# Patient Record
(demographics unavailable — no encounter records)

---

## 2024-10-31 NOTE — REVIEW OF SYSTEMS
[As Noted in HPI] : as noted in HPI [Negative] : Neurological [Suicidal] : not suicidal [Loss Of Hearing] : no hearing loss [Palpitations] : no palpitations [Shortness Of Breath] : no shortness of breath [Abdominal Pain] : no abdominal pain [Vomiting] : no vomiting [Skin Lesions] : no skin lesions [Easy Bleeding] : no tendency for easy bleeding [Easy Bruising] : no tendency for easy bruising

## 2024-10-31 NOTE — ASSESSMENT
[FreeTextEntry1] : 39 yo male with SCD, contrast allergy, s/p 10/11/24 L ICA stent, and + 2mm cavernous R ICA aneurysm.  R groin well healed no issues.  PLAN: continue  continue Brillinta 90 mg 1 tab daily MRA head non con NOVA - 3 months RTO 3 months

## 2024-10-31 NOTE — PHYSICAL EXAM
[General Appearance - Alert] : alert [General Appearance - In No Acute Distress] : in no acute distress [Clean] : clean [Dry] : dry [Well-Healed] : well-healed [No Drainage] : without drainage [Normal Skin] : normal [Oriented To Time, Place, And Person] : oriented to person, place, and time [Impaired Insight] : insight and judgment were intact [] : no respiratory distress [Edema] : there was no peripheral edema [Abnormal Walk] : normal gait [Involuntary Movements] : no involuntary movements were seen [Motor Tone] : muscle strength and tone were normal [Skin Color & Pigmentation] : normal skin color and pigmentation [Erythema] : not erythematous [Tender] : not tender [Warm] : not warm [FreeTextEntry1] : NII polk [Limited Balance] : balance was intact [Tremor] : no tremor present

## 2024-10-31 NOTE — END OF VISIT
[FreeTextEntry3] : Patient seen and evaluated by OBDULIO Don. I have reviewed images and plan as discussed above.

## 2024-10-31 NOTE — HISTORY OF PRESENT ILLNESS
[FreeTextEntry1] : Buddy is a 40 yr old male with past medical history of sickle cell, Left hearing loss since 2019 (hearing aid). To note also has contrast allergy (even with premedication, he ends up in crisis/ hospitalized). 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   On 7/30/24, he underwent a cerebral angiogram by Dr. Aragon. Taking ASA 81 daily.   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    10/29/24: pt arrives for initial post procedure visit s/p 10/11/24 LICA stent.  Right groin well healed, no irritation, erythema, edema, pain, warmth of drainage. Transient Left eye black spots evaluated during hospitalization with no new infarcts or hemorrhages noted.  No further incidents.  Overall, feeling well, taking , and Brillinta 90 1 tab BID as directed.  9/24/24:  He arrives to establish care and discuss treatment plan for known aneurysms. Patient denies sudden severe headache, seizure, nausea, vomiting, fever, chest pain, palpitations, sob, vision, hearing, or speech disturbances, denies weakness, or focal weakness, denies balance issues.  PCP:  Kim Herrera manages SCA and heme issues/ exchanges etc..

## 2024-10-31 NOTE — ASSESSMENT
[FreeTextEntry1] : 41 yo male with SCD, contrast allergy, s/p 10/11/24 L ICA stent, and + 2mm cavernous R ICA aneurysm.  R groin well healed no issues.  PLAN: continue  continue Brillinta 90 mg 1 tab daily MRA head non con NOVA - 3 months RTO 3 months

## 2024-11-11 NOTE — DISCUSSION/SUMMARY
[FreeTextEntry1] : \par  The patient is due for pain medication.\par  ISTOP checked\par  Methadone 10 mg tab- #240\par  Oxycodone- 30 mg tab- #180\par  f/u 10/28/21\par  Kim Herrera MD

## 2024-11-11 NOTE — HISTORY OF PRESENT ILLNESS
[FreeTextEntry1] : 41 yo male with HGB SS, for pre-op eval [de-identified] : 39 yo male with HGB SS, for f/u. Is averaging about 1-2 hospitalizations per month, despite exchange transfusion. 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 Has neurosurgery f/u. Has been taking Brillinta 90 mg bid and ASA, no other NSAIDS. Want to the ED with loss of vision in right eye, yesterday. Did not have a CNS event, however, had a retinal hemorrhage in his right eye.  Has OCLI scheduled. Today, he is feeling well and is not having SCD pain.  Not a candidate for HU as it causes severe thrombocytopenia as well as SCD crisis pain in this patient.  No CP, no dyspnea, no LE edema.   PE: gen- thin male, in no distress vss anicteric oropharynx- poor dentition lungs- cta cor: rrr+2/6 leona abd- benign ext:-c/c/e, no ulcers  labs:11/11/24 HGB- 11.2 PLTs- 371 CMP WNL UA WNL  A/P- 39 yo male with hGB SS, CNS aneurisms, s/p cns stenting  1. SCD-exchange transfusion monthly 2. retinal bleed- close f/u with retina specialist 3. chronic pain Methadone- 20 mg bid  Oxycodone- 30 mg tab- 1-2 PO q 4-6 hours- #240/month 4. f/u with neurosurgery  5.  f/u for routine visit 8 weeks  Kim Herrera MD

## 2025-01-03 NOTE — HISTORY OF PRESENT ILLNESS
[de-identified] : 41 yo male with Hb SS who has about 1-2 hospitalizations per month despite monthly RCE.  Had H/A in back of L eye and L side of head and was found to have 1-2 aneurysms which were stented on 10/11/24.  He is now on 90 mg Brillinta bid and 325 of ASA' he should not be on ibuprofen or fish oil while on Brillinta.   He also had DL Vortex port changed on Dec 23 and was just released from hospital on Dec 31 for pain related to port placement.   Will not be able to have next RCE until FEbruary savanna has to wait 6 weeks from port placement.  Currently has some low back pain and  L hip pain from AVN.   His L sided H/A have resolved since his stenting.  He says the Brillinta is triggering pain crises all over the body.  He has not brought this up with the neurologist yet. Cannot tolerate HU due to low platelet count.  Wpuld like to try Endari again.   Vit D level 26 on Nov 24.  Ferritin level Nov 24 was 44.

## 2025-01-03 NOTE — REASON FOR VISIT
[Home] : at home, [unfilled] , at the time of the visit. [Other Location: e.g. 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

## 2025-01-03 NOTE — ASSESSMENT
[FreeTextEntry1] : 1.  Hb SS.  Continue RCE to keep Hct stable and HbS% around 30% for refractory pain.  Added back Endari per patient request.  2.  Chronic pain from AVN and back.  Renewed oxycodone and methadone.  3.  Vit D.  Has enough supply.  4,  Aneurysyms.  Doing well with resolution of H/As.  HIs tinnitus and hearing loss did not improve.  5.  AVN L hip.  Stable.  I spent 30 minutes wtih Mr. Pizarro.

## 2025-01-27 NOTE — HISTORY OF PRESENT ILLNESS
[FreeTextEntry1] : 41 yo male with HGB SS, for pre-op eval [de-identified] : 39 yo male with HGB SS, for f/u. Is averaging about 1-2 hospitalizations per month, despite exchange transfusion. 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 Has neurosurgery f/u. Has been taking Brillinta 90 mg bid and ASA, no other NSAIDS. Patient says that the Brillanta is causing frequent pain crisis, and more frequent hospitalizations. Having bleeding in retina. Saw ophtho, referred to retina specialist. S/P retinal bleed in right eye 2 months ago, now has blurring in left eye. Had a new port placed due top scar tissue one month ago. His exchange transfusion is being delayed by two weeks due to the new port placement.  Want to the ED with loss of vision in right eye, yesterday. Did not have a CNS event, however, had a retinal hemorrhage in his right eye.  Has OCLI scheduled. Today, he is feeling well and is not having SCD pain.  Not a candidate for HU as it causes severe thrombocytopenia as well as SCD crisis pain in this patient.  No CP, no dyspnea, no LE edema.   PE: gen- thin male, in no distress vss anicteric oropharynx- poor dentition lungs- cta cor: rrr+2/6 leona abd- benign ext:-c/c/e, no ulcers  labs:11/11/24 HGB- 11.2 PLTs- 371 CMP WNL UA WNL  A/P- 39 yo male with hGB SS, CNS aneurisms, s/p cns stenting  1. SCD-exchange transfusion monthly, next delayed for two weeks 2. retinal bleed- close f/u with retina specialist 3. chronic pain Methadone- 20 mg bid  Oxycodone- 30 mg tab- 1-2 PO q 4-6 hours- #240/month 4. f/u with neurosurgery -ask @ changing to a different blood thinner 5.  f/u for routine visit 8 weeks 6. refer back to retina specialist  Kim Herrera MD

## 2025-01-28 NOTE — REASON FOR VISIT
[Home] : at home, [unfilled] , at the time of the visit. [Medical Office: ([facility])___] : at the medical office located in  [Verbal consent obtained from patient] : the patient, [unfilled]

## 2025-01-28 NOTE — REASON FOR VISIT
[Home] : at home, [unfilled] , at the time of the visit. [Medical Office: ([facility])___] : at the medical office located in  [Verbal consent obtained from patient] : the patient, [unfilled]

## 2025-02-02 NOTE — END OF VISIT
[FreeTextEntry3] :  I have evaluated patient with NP Guillermina oDn who has completed the documentation above.

## 2025-02-02 NOTE — END OF VISIT
[FreeTextEntry3] :  I have evaluated patient with NP Guillermina Don who has completed the documentation above.

## 2025-02-02 NOTE — RESULTS/DATA
[FreeTextEntry1] : ACC: [id number]     EXAM:  MR ANGIO BRAIN   ORDERED BY: LORENZO RODRIGUEZ  PROCEDURE DATE:  01/22/2025  INTERPRETATION:  Clinical indication: Stented left supraclinoid internal carotid artery for aneurysm, follow-up  A 3-D axial noncontrast MRA were performed on the cervical and intracranial vessels, respectively. Intravascular flow quantification was performed using gated 2D phase contrast MR, imaged perpendicular to the vessel axis.  Images were post processed NOVA software and a NOVA flow study report is available.   Comparison is made with the conventional angiogram of 12/19/2024.   There is signal dropout overlying the left cavernous/supraclinoid internal carotid artery related to the presence of a stent. Good intracranial flow is identified using noninvasive flow MR angiography.  Flow is as follows in milliliters per minute.  MIGUEL 256, RMCA 214, RACA 104, RACA2 92  LICA 271, LMCA 163, LACA 90, LACA2 125  , LVA 59, , RPCA 92, LPCA 72  IMPRESSION:  Signal dropout overlying the left cavernous/clinoid internal carotid artery related to the stent. Good intracranial flow using noninvasive flow MR angiography as above.  --- End of Report ---  TRIXIE AGUILERA MD; Attending Radiologist This document has been electronically signed. Jan 22 2025 12:18PM

## 2025-02-02 NOTE — RESULTS/DATA
[FreeTextEntry1] : ACC: [id number]     EXAM:  MR ANGIO BRAIN   ORDERED BY: LORENZO RODRIGUEZ  PROCEDURE DATE:  01/22/2025  INTERPRETATION:  Clinical indication: Stented left supraclinoid internal carotid artery for aneurysm, follow-up  A 3-D axial noncontrast MRA were performed on the cervical and intracranial vessels, respectively. Intravascular flow quantification was performed using gated 2D phase contrast MR, imaged perpendicular to the vessel axis.  Images were post processed NOVA software and a NOVA flow study report is available.   Comparison is made with the conventional angiogram of 12/19/2024.   There is signal dropout overlying the left cavernous/supraclinoid internal carotid artery related to the presence of a stent. Good intracranial flow is identified using noninvasive flow MR angiography.  Flow is as follows in milliliters per minute.  MIGUEL 256, RMCA 214, RACA 104, RACA2 92  LICA 271, LMCA 163, LACA 90, LACA2 125  , LVA 59, , RPCA 92, LPCA 72  IMPRESSION:  Signal dropout overlying the left cavernous/clinoid internal carotid artery related to the stent. Good intracranial flow using noninvasive flow MR angiography as above.  --- End of Report ---  TRIXIE AGUILERA MD; Attending Radiologist This document has been electronically signed. Jan 22 2025 12:18PM

## 2025-02-02 NOTE — HISTORY OF PRESENT ILLNESS
[FreeTextEntry1] : Buddy is a 40 yr old male with past medical history of sickle cell, Left hearing loss since 2019 (hearing aid). To note also has contrast allergy (even with premedication, he ends up in crisis/ hospitalized). 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   On 7/30/24, he underwent a cerebral angiogram by Dr. Aragon. Taking ASA 81 daily.  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    1/28/25: pt arrives for 3 month f/u with MRA/NOVA of 1/22/25 for review in PACS/NW.  Pt reports on Brilinta has frequent pain and Sickle cell crisis and would like to know if can change medication.  Patient currently in hospital with pain crisis today.  Reviewed NOVA shows good blood flow.  due for 6 month follow up cerebral angiogram to assess LICA stent placed 10/11/24.   10/29/24: pt arrives for initial post procedure visit s/p 10/11/24 LICA stent.  Right groin well healed, no irritation, erythema, edema, pain, warmth of drainage. Transient Left eye black spots evaluated during hospitalization with no new infarcts or hemorrhages noted.  No further incidents.  Overall, feeling well, taking , and Brillinta 90 1 tab BID as directed.  9/24/24:  He arrives to establish care and discuss treatment plan for known aneurysms. Patient denies sudden severe headache, seizure, nausea, vomiting, fever, chest pain, palpitations, sob, vision, hearing, or speech disturbances, denies weakness, or focal weakness, denies balance issues.  PCP:  Kim Herrera manages SCA and heme issues/ exchanges etc..

## 2025-02-02 NOTE — RESULTS/DATA
[FreeTextEntry1] : ACC: [id number]     EXAM:  MR ANGIO BRAIN   ORDERED BY: LORENZO RODRIGUEZ  PROCEDURE DATE:  01/22/2025  INTERPRETATION:  Clinical indication: Stented left supraclinoid internal carotid artery for aneurysm, follow-up  A 3-D axial noncontrast MRA were performed on the cervical and intracranial vessels, respectively. Intravascular flow quantification was performed using gated 2D phase contrast MR, imaged perpendicular to the vessel axis.  Images were post processed NOVA software and a NOVA flow study report is available.   Comparison is made with the conventional angiogram of 12/19/2024.   There is signal dropout overlying the left cavernous/supraclinoid internal carotid artery related to the presence of a stent. Good intracranial flow is identified using noninvasive flow MR angiography.  Flow is as follows in milliliters per minute.  MIGUEL 256, RMCA 214, RACA 104, RACA2 92  LICA 271, LMCA 163, LACA 90, LACA2 125  , LVA 59, , RPCA 92, LPCA 72  IMPRESSION:  Signal dropout overlying the left cavernous/clinoid internal carotid artery related to the stent. Good intracranial flow using noninvasive flow MR angiography as above.  --- End of Report ---  TRIXIE AGUILERA MD; Attending Radiologist This document has been electronically signed. Jan 22 2025 12:18PM

## 2025-02-02 NOTE — RESULTS/DATA
[FreeTextEntry1] : ACC: [id number]     EXAM:  MR ANGIO BRAIN   ORDERED BY: LORENZO RODRIGUEZ  PROCEDURE DATE:  01/22/2025  INTERPRETATION:  Clinical indication: Stented left supraclinoid internal carotid artery for aneurysm, follow-up  A 3-D axial noncontrast MRA were performed on the cervical and intracranial vessels, respectively. Intravascular flow quantification was performed using gated 2D phase contrast MR, imaged perpendicular to the vessel axis.  Images were post processed NOVA software and a NOVA flow study report is available.   Comparison is made with the conventional angiogram of 12/19/2024.   There is signal dropout overlying the left cavernous/supraclinoid internal carotid artery related to the presence of a stent. Good intracranial flow is identified using noninvasive flow MR angiography.  Flow is as follows in milliliters per minute.  MIGUEL 256, RMCA 214, RACA 104, RACA2 92  LICA 271, LMCA 163, LACA 90, LACA2 125  , LVA 59, , RPCA 92, LPCA 72  IMPRESSION:  Signal dropout overlying the left cavernous/clinoid internal carotid artery related to the stent. Good intracranial flow using noninvasive flow MR angiography as above.  --- End of Report ---  TRIXIE AGUILERA MD; Attending Radiologist This document has been electronically signed. Jan 22 2025 12:18PM

## 2025-02-02 NOTE — ASSESSMENT
[FreeTextEntry1] : LICA stent placed 10/11/24, Sickel cell anemia (severe)  1/22/25 MRA head non con w/NOVA MIGUEL 256, RMCA 214, RACA 104, RACA2 92 LICA 271, LMCA 163, LACA 90, LACA2 125 , LVA 59, , RPCA 92, LPCA 72 IMPRESSION: Signal dropout overlying the left cavernous/clinoid internal carotid artery related to the stent. Good intracranial flow using noninvasive flow MR angiography as above.  3 month f/u with MRA/NOVA of 1/22/25 for review in PACS/NW.  Pt reports on Brilinta has frequent pain and Sickle cell crisis and would like to know if can change medication.  Patient currently in hospital with pain crisis today.  Reviewed NOVA shows good blood flow.  PLAN: due for 6 month follow up cerebral angiogram to assess LICA stent placed 10/11/24. - schedule for 4/2025 PST PCP Dr. Herrera consult prior for transfusion plan prior to cerebral angiogram as before  continue  and Brilinta 90 bid up to and including day of procedure

## 2025-03-14 NOTE — HISTORY OF PRESENT ILLNESS
[FreeTextEntry1] : Buddy is a 40 yr old male with past medical history of sickle cell, Left hearing loss since 2019 (hearing aid). To note also has contrast allergy (even with premedication, he ends up in crisis/ hospitalized). 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   On 7/30/24, he underwent a cerebral angiogram by Dr. Aragon. Taking ASA 81 daily.  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    3/18/25: pt arrives for 3 month f/u  due for 6 month follow up cerebral angiogram to assess LICA stent placed 10/11/24.  Brilinta 90 bid   1/28/25: pt arrives for 3 month f/u with MRA/NOVA of 1/22/25 for review in PACS/NW.  Pt reports on Brilinta has frequent pain and Sickle cell crisis and would like to know if can change medication.  Patient currently in hospital with pain crisis today.  Reviewed NOVA shows good blood flow.  10/29/24: pt arrives for initial post procedure visit s/p 10/11/24 LICA stent.  Right groin well healed, no irritation, erythema, edema, pain, warmth of drainage. Transient Left eye black spots evaluated during hospitalization with no new infarcts or hemorrhages noted.  No further incidents.  Overall, feeling well, taking , and Brillinta 90 1 tab BID as directed.  9/24/24:  He arrives to establish care and discuss treatment plan for known aneurysms. Patient denies sudden severe headache, seizure, nausea, vomiting, fever, chest pain, palpitations, sob, vision, hearing, or speech disturbances, denies weakness, or focal weakness, denies balance issues.  PCP:  Kim Herrera manages SCA and heme issues/ exchanges etc..

## 2025-03-20 NOTE — HISTORY OF PRESENT ILLNESS
[de-identified] : 40 year old man presents for evaluation for possible Left TM perforation that seen in [facility] about one month ago.  Last seen in office 3/10/2020  H/o bilateral SNHL and Tinnitus L>R Reports some Left bloody otorrhea and Right yellow otorrhea for about one year. Was prescribed Bactrim and Cipro drops with some relief. Wears hearing aids in both ears - mother feels hearing has gotten worse.  Constant left tinnitus - stable Patient denies otalgia, recent fevers or ear infections, dizziness, vertigo, headaches related to hearing.

## 2025-03-20 NOTE — PHYSICAL EXAM
[Binocular Microscopic Exam] : Binocular microscopic exam was performed [Hearing Warner Test (Tuning Fork On Forehead)] : no lateralization of tone [Midline] : trachea located in midline position [Normal] : orientation to person, place, and time: normal [Hearing Loss Right Only] : normal [Hearing Loss Left Only] : normal [Rinne Test Air Conduction Persists > Bone Conduction Right] : bone conduction greater than air conduction on the right [Rinne Test Air Conduction Persists > Bone Conduction Left] : bone conduction greater than air conduction on the left [Nystagmus] : ~T no ~M nystagmus was seen [Fukuda Step Test] : Fukuda Step Test was Negative [Romberg's Sign] : Romberg's sign was absent [Fistula Sign] : Fistula Sign: Negative [Past-Pointing] : Past-Pointing: Negative [Gloria-Hallwaike] : [place]-Hallpike: Negative

## 2025-03-20 NOTE — HISTORY OF PRESENT ILLNESS
[de-identified] : 40 year old man presents for evaluation for possible Left TM perforation that seen in [facility] about one month ago.  Last seen in office 3/10/2020  H/o bilateral SNHL and Tinnitus L>R Reports some Left bloody otorrhea and Right yellow otorrhea for about one year. Was prescribed Bactrim and Cipro drops with some relief. Wears hearing aids in both ears - mother feels hearing has gotten worse.  Constant left tinnitus - stable Patient denies otalgia, recent fevers or ear infections, dizziness, vertigo, headaches related to hearing.

## 2025-03-20 NOTE — PHYSICAL EXAM
[Binocular Microscopic Exam] : Binocular microscopic exam was performed [Hearing Warner Test (Tuning Fork On Forehead)] : no lateralization of tone [Midline] : trachea located in midline position [Normal] : orientation to person, place, and time: normal [Hearing Loss Right Only] : normal [Hearing Loss Left Only] : normal [Rinne Test Air Conduction Persists > Bone Conduction Right] : bone conduction greater than air conduction on the right [Nystagmus] : ~T no ~M nystagmus was seen [Rinne Test Air Conduction Persists > Bone Conduction Left] : bone conduction greater than air conduction on the left [Fukuda Step Test] : Fukuda Step Test was Negative [Romberg's Sign] : Romberg's sign was absent [Fistula Sign] : Fistula Sign: Negative [Past-Pointing] : Past-Pointing: Negative [Gloria-Hallwaike] : [place]-Hallpike: Negative

## 2025-03-20 NOTE — DATA REVIEWED
[de-identified] : An audiogram was ordered and performed including pure tones, tympanometry, and speech testing for the patients complaint of hearing loss I have independently reviewed the patient's audiogram for today and my findings include adalberto SNHL, L ear to 20% SDS

## 2025-03-20 NOTE — DATA REVIEWED
[de-identified] : An audiogram was ordered and performed including pure tones, tympanometry, and speech testing for the patients complaint of hearing loss I have independently reviewed the patient's audiogram for today and my findings include adalberto SNHL, L ear to 20% SDS No

## 2025-03-20 NOTE — PROCEDURE
[Same] : same as the Pre Op Dx. [] : Binocular Microscopy [FreeTextEntry4] : none [FreeTextEntry6] : Operative microscope was used to examine the ear canal, ear drum and visible middle ear landmarks. Adequate exam would not have been possible without the use of a microscope. Findings are described.

## 2025-03-30 NOTE — HISTORY OF PRESENT ILLNESS
[FreeTextEntry1] : 41 yo male with HGB SS, for pre-op eval [de-identified] : 41 yo male with HGB SS, for f/u. Is averaging about 1-2 hospitalizations per month, despite exchange transfusion. 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 Going for f/u arteriogram 4/18/25 ( will require steroids pre-study for dye allergy). Has been taking Brillinta 90 mg bid and ASA, no other NSAIDS. Patient says that the Brillanta is causing frequent pain crisis, and more frequent hospitalizations. Having bleeding in retina. Saw ophtho, referred to retina specialist. S/P retinal bleed in right eye 2 months ago, now has blurring in left eye. Also, getting a cochlear implant at the end of April.  Had a new port placed due top scar tissue 3 months ago. His exchange transfusions are going smoothly now, UTD. Last performed 3/21/25. Want to the ED with loss of vision in right eye, yesterday. Did not have a CNS event, however, had a retinal hemorrhage in his right eye.  Has OCLI scheduled. Today, he is feeling well and is not having SCD pain.  Not a candidate for HU as it causes severe thrombocytopenia as well as SCD crisis pain in this patient.  No CP, no dyspnea, no LE edema.   PE: gen- thin male, in no distress vss anicteric oropharynx- poor dentition lungs- cta cor: rrr+2/6 leona abd- benign ext:-c/c/e, no ulcers  labs:3/26/25 HGB- 10.5 PLTs- 371 CMP WNL UA WNL Pre-HGB 39.7-->79.4  A/P- 41 yo male with hGB SS, CNS aneurisms, s/p cns stenting. Now going for angiogram f/u of stent placement, as well as cochlear implant. 1.For stent placement- Exchange transfusion as close pre-procedure as possible. Prednisone 50 mg PO 13 hours, 7 hours, and 1 hour prior to angiogram Benedryl 50 mg PO, one hour prior to angiogram Stop Brillanta 2 days piror to the angiogram  1. SCD-exchange transfusion monthly, next delayed for two weeks 2. retinal bleed- close f/u with retina specialist 3. chronic pain Methadone- 20 mg bid  Oxycodone- 30 mg tab- 1-2 PO q 4-6 hours- #240/month 4. f/u with neurosurgery -ask @ changing to a different blood thinner 5.  f/u for routine visit 8 weeks 6. prevnar at next outpatient visit  Kim Herrera MD

## 2025-03-30 NOTE — HISTORY OF PRESENT ILLNESS
[FreeTextEntry1] : 39 yo male with HGB SS, for pre-op eval [de-identified] : 41 yo male with HGB SS, for f/u. Is averaging about 1-2 hospitalizations per month, despite exchange transfusion. 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 Going for f/u arteriogram 4/18/25 ( will require steroids pre-study for dye allergy). Has been taking Brillinta 90 mg bid and ASA, no other NSAIDS. Patient says that the Brillanta is causing frequent pain crisis, and more frequent hospitalizations. Having bleeding in retina. Saw ophtho, referred to retina specialist. S/P retinal bleed in right eye 2 months ago, now has blurring in left eye. Also, getting a cochlear implant at the end of April.  Had a new port placed due top scar tissue 3 months ago. His exchange transfusions are going smoothly now, UTD. Last performed 3/21/25. Want to the ED with loss of vision in right eye, yesterday. Did not have a CNS event, however, had a retinal hemorrhage in his right eye.  Has OCLI scheduled. Today, he is feeling well and is not having SCD pain.  Not a candidate for HU as it causes severe thrombocytopenia as well as SCD crisis pain in this patient.  No CP, no dyspnea, no LE edema.   PE: gen- thin male, in no distress vss anicteric oropharynx- poor dentition lungs- cta cor: rrr+2/6 leona abd- benign ext:-c/c/e, no ulcers  labs:3/26/25 HGB- 10.5 PLTs- 371 CMP WNL UA WNL Pre-HGB 39.7-->79.4  A/P- 41 yo male with hGB SS, CNS aneurisms, s/p cns stenting. Now going for angiogram f/u of stent placement, as well as cochlear implant. 1.For stent placement- Exchange transfusion as close pre-procedure as possible. Prednisone 50 mg PO 13 hours, 7 hours, and 1 hour prior to angiogram Benedryl 50 mg PO, one hour prior to angiogram Stop Brillanta 2 days piror to the angiogram  1. SCD-exchange transfusion monthly, next delayed for two weeks 2. retinal bleed- close f/u with retina specialist 3. chronic pain Methadone- 20 mg bid  Oxycodone- 30 mg tab- 1-2 PO q 4-6 hours- #240/month 4. f/u with neurosurgery -ask @ changing to a different blood thinner 5.  f/u for routine visit 8 weeks 6. prevnar at next outpatient visit  iKm Herrera MD

## 2025-04-10 NOTE — REASON FOR VISIT
[Home] : at home, [unfilled] , at the time of the visit. [Medical Office: ([facility])___] : at the medical office located in  [Telephone (audio)] : This telephonic visit was provided via audio only technology. [Verbal consent obtained from patient] : the patient, [unfilled]

## 2025-04-10 NOTE — REASON FOR VISIT
[Home] : at home, [unfilled] , at the time of the visit. [Medical Office: ([facility])___] : at the medical office located in  [Telephone (audio)] : This telephonic visit was provided via audio only technology. [Verbal consent obtained from patient] : the patient, [unfilled]

## 2025-04-10 NOTE — REASON FOR VISIT
[Home] : at home, [unfilled] , at the time of the visit. [Medical Office: ([facility])___] : at the medical office located in  [Telephone (audio)] : This telephonic visit was provided via audio only technology. [Verbal consent obtained from patient] : the patient, [unfilled]

## 2025-04-14 NOTE — ASSESSMENT
[FreeTextEntry1] : Buddy is a 40 yr old male with past medical history of sickle cell, Left hearing loss since 2019 (hearing aid). To note also has contrast allergy (even with premedication, he ends up in crisis/ hospitalized). 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   On 7/30/24, he underwent a cerebral angiogram by Dr. Aragon. Taking ASA 81 daily.  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  Patient underwent Pipeline embolization of LICA aneurysm in 10/2024.  4/10/25: Today, he arrives to [facility] care due for 6 month follow up cerebral angiogram to assess LICA stent placed 10/11/24. Compliant on  and Brilinta 90 bid  PLAN Schedule diagnostic cerebral angiogram 4/18/25 PST scheduled  continue ASA / Brilinta 90 bid up to and including day of procedure PCP:  Kim Herrera manages SCA and heme issues/ exchanges etc..

## 2025-04-14 NOTE — HISTORY OF PRESENT ILLNESS
[FreeTextEntry1] : Buddy is a 40 yr old male with past medical history of sickle cell, Left hearing loss since 2019 (hearing aid). To note also has contrast allergy (even with premedication, he ends up in crisis/ hospitalized). 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   On 7/30/24, he underwent a cerebral angiogram by Dr. Aragon. Taking ASA 81 daily.  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    4/10/25: Today, he arrives to [facility] [place] for 6 month follow up cerebral angiogram to assess LICA stent placed 10/11/24. Compliant on  and Brilinta 90 bid   MRA/NOVA of 1/22/25 for review in PACS/NW reviewed NOVA shows good blood flow.  Patient denies sudden severe headache, seizure, nausea, vomiting, fever, chest pain, palpitations, sob, vision, hearing, or speech disturbances, denies weakness, or focal weakness, denies balance issues.  PCP:  Kim Herrera manages SCA and heme issues/ exchanges etc..

## 2025-04-14 NOTE — ASSESSMENT
[FreeTextEntry1] : Buddy is a 40 yr old male with past medical history of sickle cell, Left hearing loss since 2019 (hearing aid). To note also has contrast allergy (even with premedication, he ends up in crisis/ hospitalized). 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   On 7/30/24, he underwent a cerebral angiogram by Dr. Aragon. Taking ASA 81 daily.  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  Patient underwent Pipeline embolization of LICA aneurysm in 10/2024.  4/10/25: Today, he arrives to \[facility]\"" care due for 6 month follow up cerebral angiogram to assess LICA stent placed 10/11/24. Compliant on  and Brilinta 90 bid  PLAN Schedule diagnostic cerebral angiogram 4/18/25 PST scheduled  continue ASA / Brilinta 90 bid up to and including day of procedure PCP:  Kim Herrera manages SCA and heme issues/ exchanges etc..

## 2025-04-14 NOTE — HISTORY OF PRESENT ILLNESS
[FreeTextEntry1] : Buddy is a 40 yr old male with past medical history of sickle cell, Left hearing loss since 2019 (hearing aid). To note also has contrast allergy (even with premedication, he ends up in crisis/ hospitalized). 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   On 7/30/24, he underwent a cerebral angiogram by Dr. Aragon. Taking ASA 81 daily.  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    4/10/25: Today, he [place] [facility] [place] 6 month follow up cerebral angiogram to assess LICA stent placed 10/11/24. Compliant on  and Brilinta 90 bid   MRA/NOVA of 1/22/25 for review in PACS/NW reviewed NOVA shows good blood flow.  Patient denies sudden severe headache, seizure, nausea, vomiting, fever, chest pain, palpitations, sob, vision, hearing, or speech disturbances, denies weakness, or focal weakness, denies balance issues.  PCP:  Kim Herrera manages SCA and heme issues/ exchanges etc..

## 2025-04-14 NOTE — ASSESSMENT
[FreeTextEntry1] : Buddy is a 40 yr old male with past medical history of sickle cell, Left hearing loss since 2019 (hearing aid). To note also has contrast allergy (even with premedication, he ends up in crisis/ hospitalized). 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   On 7/30/24, he underwent a cerebral angiogram by Dr. Aragon. Taking ASA 81 daily.  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  Patient underwent Pipeline embolization of LICA aneurysm in 10/2024.  4/10/25: Today, he arrives to [facility] care due for 6 month follow up cerebral angiogram to assess LICA stent placed 10/11/24. Compliant on  and Brilinta 90 bid  PLAN Schedule diagnostic cerebral angiogram 4/18/25 PST scheduled  continue ASA / Brilinta 90 bid up to and including day of procedure PCP:  Kim Herrera manages SCA and heme issues/ exchanges etc..

## 2025-04-14 NOTE — HISTORY OF PRESENT ILLNESS
[FreeTextEntry1] : Buddy is a 40 yr old male with past medical history of sickle cell, Left hearing loss since 2019 (hearing aid). To note also has contrast allergy (even with premedication, he ends up in crisis/ hospitalized). 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   On 7/30/24, he underwent a cerebral angiogram by Dr. Aragon. Taking ASA 81 daily.  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    4/10/25: Today, he arrives to [facility] [place] for 6 month follow up cerebral angiogram to assess LICA stent placed 10/11/24. Compliant on  and Brilinta 90 bid   MRA/NOVA of 1/22/25 for review in PACS/NW reviewed NOVA shows good blood flow.  Patient denies sudden severe headache, seizure, nausea, vomiting, fever, chest pain, palpitations, sob, vision, hearing, or speech disturbances, denies weakness, or focal weakness, denies balance issues.  PCP:  Kim Herrera manages SCA and heme issues/ exchanges etc..

## 2025-04-24 NOTE — HISTORY OF PRESENT ILLNESS
[FreeTextEntry1] : Patient is a 40 year old male who has been seen by Dr. Price for hearing loss and CI consultation. Patient intends to move forward with CI. Patient has a longstanding history of bilateral SNHL and has been seen at this center for hearing aids in the past. Most recent hearing test results show moderately severe to severe SNHL in the left ear and moderately severe to profound SNHL in the right ear. Patient has been medically cleared for new hearing aids.

## 2025-05-01 NOTE — HISTORY OF PRESENT ILLNESS
[FreeTextEntry1] : 41 yo male with HGB SS, for pre-op eval [de-identified] : 39 yo male with HGB SS, for f/u. Is averaging about 1-2 hospitalizations per month, despite exchange transfusion. Indicaton for monthly exchange transfusion was chronic pain.   S/P stenting of 2 CNS aneurisms 10/24, with f/u cerebral angiogram 4/18/25. Results are still pending. Patient no longer requires Brillinta.  + h/o SCD retinopathy with bleed, following with OCLI Also, getting a cochlear implant . Notes that his hearing is worsening.  Had a new port placed due top scar tissue 6 months ago. His exchange transfusions are going smoothly now, UTD. Last performed 3/21/25. Today, he is feeling well and is not having SCD pain.  Not a candidate for HU as it causes severe thrombocytopenia as well as SCD crisis pain in this patient.  Fx significant for death of a brother due to SCD, GVHD post BMT No CP, no dyspnea, no LE edema.   PE: gen- thin male, in no distress vss anicteric oropharynx- poor dentition lungs- cta cor: rrr+2/6 leona abd- benign ext:-c/c/e, no ulcers  labs:4/30/25 HGB- 10.4, PLTs 429 CMP essentially WNL UA WNL Pre-HGB A 72.5 Ferritin 3/20/25- 31  A/P- 39 yo male with hGB SS, CNS aneurisms, s/p cns stenting and cerebral angio, results pending. Will be going for cochlear implant 1.For cochlear implant- Exchange transfusion as close pre-procedure as possible. 2. SCD-exchange transfusion monthly f/u with hematology 2. retinal bleed- close f/u with retina specialist 3. chronic pain Methadone- 20 mg bid  Oxycodone- 30 mg tab- 1-2 PO q 4-6 hours- #240/month 4.  f/u for routine visit 4 weeks 5. prevnar at next outpatient visit  Kim Herrera MD

## 2025-05-01 NOTE — HISTORY OF PRESENT ILLNESS
[FreeTextEntry1] : 39 yo male with HGB SS, for pre-op eval [de-identified] : 39 yo male with HGB SS, for f/u. Is averaging about 1-2 hospitalizations per month, despite exchange transfusion. Indicaton for monthly exchange transfusion was chronic pain.   S/P stenting of 2 CNS aneurisms 10/24, with f/u cerebral angiogram 4/18/25. Results are still pending. Patient no longer requires Brillinta.  + h/o SCD retinopathy with bleed, following with OCLI Also, getting a cochlear implant . Notes that his hearing is worsening.  Had a new port placed due top scar tissue 6 months ago. His exchange transfusions are going smoothly now, UTD. Last performed 3/21/25. Today, he is feeling well and is not having SCD pain.  Not a candidate for HU as it causes severe thrombocytopenia as well as SCD crisis pain in this patient.  Fx significant for death of a brother due to SCD, GVHD post BMT No CP, no dyspnea, no LE edema.   PE: gen- thin male, in no distress vss anicteric oropharynx- poor dentition lungs- cta cor: rrr+2/6 leona abd- benign ext:-c/c/e, no ulcers  labs:4/30/25 HGB- 10.4, PLTs 429 CMP essentially WNL UA WNL Pre-HGB A 72.5 Ferritin 3/20/25- 31  A/P- 39 yo male with hGB SS, CNS aneurisms, s/p cns stenting and cerebral angio, results pending. Will be going for cochlear implant 1.For cochlear implant- Exchange transfusion as close pre-procedure as possible. 2. SCD-exchange transfusion monthly f/u with hematology 2. retinal bleed- close f/u with retina specialist 3. chronic pain Methadone- 20 mg bid  Oxycodone- 30 mg tab- 1-2 PO q 4-6 hours- #240/month 4.  f/u for routine visit 4 weeks 5. prevnar at next outpatient visit  Kim Herrera MD

## 2025-06-07 NOTE — HISTORY OF PRESENT ILLNESS
[FreeTextEntry1] : 41 yo male with HGB SS, for f/u [de-identified] : 41 yo male with HGB SS, for f/u. Is averaging about 1-2 hospitalizations per month, despite exchange transfusion. Indicaton for monthly exchange transfusion was chronic pain.   S/P stenting of 2 CNS aneurisms 10/24, with f/u stenting of cerebral aneurisms ( successful) 10/24. Patient no longer requires Brillinta.  + h/o SCD retinopathy with bleed, following with OCLI Also,will be getting a cochlear implant in August. Had a new port placed due top scar tissue 8 months ago. His exchange transfusions are going smoothly now. Will be performed 6/5/25. Today, he is feeling well and is not having SCD pain.  Not a candidate for HU as it causes severe thrombocytopenia as well as SCD crisis pain in this patient.  Fx significant for death of a brother due to SCD, GVHD post BMT No CP, no dyspnea, no LE edema.   PE: gen- thin male, in no distress vss anicteric oropharynx- poor dentition lungs- cta cor: rrr+2/6 leona abd- benign ext:-c/c/e, no ulcers  labs:6/2/25 HGB- 11 post transfusion, PLTs 243 CMP WNL UA WNL Pre-HGB A 40.1, post 76.8 Ferritin 3/20/25- 31  A/P- 41 yo male with hGB SS, CNS aneurisms, s/p cns stenting and cerebral angio, results pending. Will be going for cochlear implant 1.eventual cochlear implant- Exchange transfusion as close pre-procedure as possible. 2. SCD-exchange transfusion monthly f/u with hematology 2. retinal bleed- close f/u with retina specialist 3. chronic pain Methadone- 20 mg bid  Oxycodone- 30 mg tab- 1-2 PO q 4-6 hours- #240/month 4.  f/u for routine visit 4 weeks 5. prevnar at next outpatient visit  Kim Herrera MD

## 2025-06-09 NOTE — HISTORY OF PRESENT ILLNESS
[FreeTextEntry1] : establish care [de-identified] : 39 yo male with HGB SS, for f/u. per chart review, pt is averaging about 1-2 hospitalizations per month, despite exchange transfusion.  S/P stenting of 2 CNS aneurisms 10/24, with f/u stenting of cerebral aneurisms ( successful) 10/24. Patient no longer requires Brillinta. + h/o SCD retinopathy with bleed. Pt feels well today without SCD pain. Reports having a transfusion yesterday and tolerated well. Pt is concerned about maintaining close communication with his medical team with his current pcp retiring.  Fx significant for death of a brother due to SCD, GVHD post BMT. No CP, no dyspnea, no LE edema.

## 2025-06-09 NOTE — ASSESSMENT
[FreeTextEntry1] : A/P- 39 yo male with hGB SS, CNS aneurisms, s/p cns stenting and cerebral angio, results pending.  #SCD - Not a candidate for HU as it causes severe thrombocytopenia as well as SCD crisis pain in this patient - exchange transfusion monthly, most recently 6/5/25, Had a new port placed due top scar tissue 4th quarter 2024 - f/u with hematology, scheduled 6/10/25  #Hearing deficits - Will be going for cochlear implant, potentially August 2025  - Exchange transfusion as close pre-procedure as possible.  #retinal bleed - close f/u with retina specialist  #chronic pain - Indicaton for monthly exchange transfusion was chronic pain. - Methadone- 20 mg bid - Oxycodone- 30 mg tab- 1-2 PO q 4-6 hours- #240/month  HCM  - RTC 4 week routine visit - prevnar at next outpatient visit

## 2025-06-12 NOTE — HISTORY OF PRESENT ILLNESS
[de-identified] : 40 year old M with  [  ] HbSS   [  ] HbSC   [  ] HbS-beta-0-thal  [  ] HbS-beta-+-thal  disease. Baseline Hgb: Whose course has been complicated by: sickle retinopathy with bleed   Today's Visit: Hospitalized since last office visit:   Yes [  ]   No [  ]         Date:         Transfused:  Yes [  ]   No [  ]         Antibiotics:  Yes [  ]   No [  ]   ED visit since last office visit:   Yes [  ]   No [  ]         Date:   History: Acute chest syndrome:  Yes [  ]   No [  ] Avascular necrosis:  Yes [  ]   No [ x ] Cerebral vascular events:  Yes [  ]   No [  ] Splenomegaly:  Yes [  ]   No [  ] Splenectomy:   Yes [  ]   No [  ] Priapism:   Yes [  ]   No [  ]   Not applicable [  ] Iron overload:  Last ferritin: ____ Last MRI LIC: ____ Hyperhemolysis syndrome:   Yes [  ]   No [  ] Thrombosis:   Yes [  ]   No [  ]         If yes, still on anticoagulation?  Yes [  ]   No [  ]   Their disease has required: Routine transfusions:  Exchange [  ]   Simple [  ]   None [  ] Established Hgb goal: ___ End HCT: ___ Frequency: Last transfusion:   Pain Crisis Triggers: Usual Locations of Pain:   Medications: Hydroxyurea:   Yes [  ]   No [ x ] - severe thrombocytopenia         Dose: Crizanlizumab:   Yes [  ]   No [  ] Folic acid:   Yes [  ]   No [  ] Vitamin D:   Yes [  ]   No [  ] L-glutamine:   Yes [  ]   No [  ]   Chronic Short Acting Analgesics:   Yes [ x ]   No [  ]         Oxycodone Dose: 30 mg tabs, 1-2 tab PO q4-6 hrs (240 tabs per month)         Hydromorphone Dose:         Morphine IR Dose:   Long Acting Analgesics: Yes [ x ]   No [  ]         Methadone Dose: 20 mg BID         Morphine ER Dose:         Oxycodone ER(Oxycontin) Dose         Buprenorphine(patch/sublingual) Dose:   Bowel Regimen:         PEG 3350: Yes [  ]   No [ x ]         Senna: Yes [  ]   No [ x ]         Lactulose: Yes [  ]   No [ x ]   Iron Chelation: Deferasirox (Jadenu):  Yes [  ]   No [ x ]         Dose: Deferiprone (Ferriprox): Yes [  ]   No [ x ]         Dose: Deferoxomine (Deferal): Yes [  ]   No [ x ]         Dose:   Other Medications:   Care Team: Cardiology: Endocrinology: Fertility: Genetic Counseling: Nephrology: Ophthalmology:

## 2025-06-12 NOTE — HISTORY OF PRESENT ILLNESS
[de-identified] : 40 year old M with  [  ] HbSS   [  ] HbSC   [  ] HbS-beta-0-thal  [  ] HbS-beta-+-thal  disease. Baseline Hgb: Whose course has been complicated by: sickle retinopathy with bleed   Today's Visit: Hospitalized since last office visit:   Yes [  ]   No [  ]         Date:         Transfused:  Yes [  ]   No [  ]         Antibiotics:  Yes [  ]   No [  ]   ED visit since last office visit:   Yes [  ]   No [  ]         Date:   History: Acute chest syndrome:  Yes [  ]   No [  ] Avascular necrosis:  Yes [  ]   No [ x ] Cerebral vascular events:  Yes [  ]   No [  ] Splenomegaly:  Yes [  ]   No [  ] Splenectomy:   Yes [  ]   No [  ] Priapism:   Yes [  ]   No [  ]   Not applicable [  ] Iron overload:  Last ferritin: ____ Last MRI LIC: ____ Hyperhemolysis syndrome:   Yes [  ]   No [  ] Thrombosis:   Yes [  ]   No [  ]         If yes, still on anticoagulation?  Yes [  ]   No [  ]   Their disease has required: Routine transfusions:  Exchange [  ]   Simple [  ]   None [  ] Established Hgb goal: ___ End HCT: ___ Frequency: Last transfusion:   Pain Crisis Triggers: Usual Locations of Pain:   Medications: Hydroxyurea:   Yes [  ]   No [ x ] - severe thrombocytopenia         Dose: Crizanlizumab:   Yes [  ]   No [  ] Folic acid:   Yes [  ]   No [  ] Vitamin D:   Yes [  ]   No [  ] L-glutamine:   Yes [  ]   No [  ]   Chronic Short Acting Analgesics:   Yes [ x ]   No [  ]         Oxycodone Dose: 30 mg tabs, 1-2 tab PO q4-6 hrs (240 tabs per month)         Hydromorphone Dose:         Morphine IR Dose:   Long Acting Analgesics: Yes [ x ]   No [  ]         Methadone Dose: 20 mg BID         Morphine ER Dose:         Oxycodone ER(Oxycontin) Dose         Buprenorphine(patch/sublingual) Dose:   Bowel Regimen:         PEG 3350: Yes [  ]   No [ x ]         Senna: Yes [  ]   No [ x ]         Lactulose: Yes [  ]   No [ x ]   Iron Chelation: Deferasirox (Jadenu):  Yes [  ]   No [ x ]         Dose: Deferiprone (Ferriprox): Yes [  ]   No [ x ]         Dose: Deferoxomine (Deferal): Yes [  ]   No [ x ]         Dose:   Other Medications:   Care Team: Cardiology: Endocrinology: Fertility: Genetic Counseling: Nephrology: Ophthalmology:

## 2025-06-12 NOTE — ASSESSMENT
[FreeTextEntry1] : Assessment: 40M with HbSS PMHx:    Plan: Heme: Start/Continue/Increase Disease modifying Medication:  Yes [  ]   No [  ]         If Yes - Hydroxyurea Start/Change Dose Last ANC __ Last Retic __ Last Plts __ Start/Continue Crizanlizumab Start/Continue L-glutamine Transfuse Yes [  ]   No [  ]         If Yes Simple [  ] or Exchange [  ]         Frequency: Iron Overload:         Start/Continue Jadenu         Needs T2* hepatic and/or cardiac? Infusion Center Appointment for Crisis Yes [  ]   No [  ]         If yes, Planned Medications_____ Eligible for Transplant/Gene Therapy Yes [  ]   No [  ]         Yes: Referred to _____ Date_____         No: Ineligible due to: Few crisis/Advanced Age/Renal Function   Cardiology Endocrinology Fertility Genetic Counseling Nephrology: Last Microalbumin Date ___ Ophthalmology: last evaluation (dilated eye exam) Date ___; repeat yearly Health Care Maintenance: Pneumonia vaccine (PCV20 or PCV21) Yes [  ]   Declined [  ] Influenza vaccine Yes [  ]   No [  ] - Last 11/9/21